# Patient Record
Sex: FEMALE | Race: WHITE | NOT HISPANIC OR LATINO | Employment: FULL TIME | URBAN - METROPOLITAN AREA
[De-identification: names, ages, dates, MRNs, and addresses within clinical notes are randomized per-mention and may not be internally consistent; named-entity substitution may affect disease eponyms.]

---

## 2017-07-14 ENCOUNTER — OFFICE VISIT (OUTPATIENT)
Dept: OBSTETRICS AND GYNECOLOGY | Age: 22
End: 2017-07-14

## 2017-07-14 VITALS
HEIGHT: 68 IN | WEIGHT: 230 LBS | BODY MASS INDEX: 34.86 KG/M2 | SYSTOLIC BLOOD PRESSURE: 130 MMHG | DIASTOLIC BLOOD PRESSURE: 60 MMHG

## 2017-07-14 DIAGNOSIS — N94.6 DYSMENORRHEA: Primary | ICD-10-CM

## 2017-07-14 DIAGNOSIS — Z82.49 FAMILY HISTORY OF DVT: ICD-10-CM

## 2017-07-14 PROCEDURE — 99385 PREV VISIT NEW AGE 18-39: CPT | Performed by: OBSTETRICS & GYNECOLOGY

## 2017-07-14 RX ORDER — DOXYCYCLINE HYCLATE 100 MG/1
100 CAPSULE ORAL DAILY
COMMUNITY

## 2017-07-14 NOTE — PROGRESS NOTES
"Subjective     Chief Complaint   Patient presents with   • Annual Exam     new pt , no problems       History of Present Illness    Ana Sanderson is a 21 y.o.  who presents for annual exam.  Her menses are regular every 28-30 days, lasting 4-7 days, dysmenorrhea moderate, occurring first 1-2 days of flow   She is not sexually active but does have boyfriend so may be in future  Studying biochemistry at [employer]  Obstetric History:  OB History      Para Term  AB TAB SAB Ectopic Multiple Living    0 0 0 0 0 0 0 0 0 0         Menstrual History:  Menarche age: 14 years  Patient's last menstrual period was 2017.  Period Cycle (Days): 28  Period Duration (Days): 5 days  Period Pattern: Regular  Menstrual Flow: Moderate  Dysmenorrhea: (!) Moderate      Current contraception: abstinence  History of abnormal Pap smear: no  Received Gardasil immunization: yes - all 3  Perform regular self breast exam: no  Family history of uterine or ovarian cancer: no  Family History of colon cancer: no  Family history of breast cancer: no    Mammogram: not indicated.  Colonoscopy: not indicated.  DEXA: not indicated.    Exercise: not active  Calcium/Vitamin D: adequate intake    The following portions of the patient's history were reviewed and updated as appropriate: allergies, current medications, past family history, past medical history, past social history, past surgical history and problem list.    Review of Systems    Review of Systems   Constitutional: Negative for fatigue.   Respiratory: Negative for shortness of breath.    Gastrointestinal: Negative for abdominal pain.   Genitourinary: positive for moderate dysmenorrhea   Neurological: Negative for headaches.   Psychiatric/Behavioral: Negative for dysphoric mood.         Objective   Physical Exam    /60  Ht 68\" (172.7 cm)  Wt 230 lb (104 kg)  LMP 2017  BMI 34.97 kg/m2    General:   alert, appears stated age, cooperative and no distress "   Neck: no asymmetry, masses, or scars and thyroid normal to palpation   Heart: regular rate and rhythm, S1, S2 normal, no murmur, click, rub or gallop   Lungs: clear to auscultation bilaterally   Abdomen: soft, non-tender, without masses or organomegaly   Breast: pt declines   Vulva: pt declines   Vagina: pt declines   Cervix: pt declines   Uterus: declines   Adnexa: pt declines   Rectal: not indicated     Assessment/Plan   Ana was seen today for annual exam.    Diagnoses and all orders for this visit:    Visit for gynecologic examination    Dysmenorrhea    Family history of DVT  -     Protein C Deficiency Profile  -     Protein S Panel  -     Antithrombin III  -     Antithrombin Antigen  -     Cardiolipin Antibody  -     Homocysteine  -     Factor 5 Leiden  -     Factor II, DNA Analysis  -     Antinuclear Antibody With Reflex Cascade        All questions answered.  Breast self exam technique reviewed and patient encouraged to perform self-exam monthly.  Discussed healthy lifestyle modifications.  Recommended 30 minutes of aerobic exercise five times per week.  Encouraged condom use if sexually active in future      Discussed options for dysmenorrhea, pt does not remember pills well and is interested in future options for contraception as well. She is interested in Nexplanon. Discussed side effects of irregular bleeding, wt gain, headaches and mood changes. Encouraged pt to watch diet/increase exercise to help with potential wt gain issues, will prior auth and schedule with next menses    Pt defers exam today as not indicated, did advise pelvic exam in future if she becomes sexually active and pap at 22 yo    Pt and her mother desire thrombophilia screening due to family hx, it was in maternal GM so discussed option of her mother doing screen but they prefer for pt to have tests    F/u 3 months, pt will have nexplanon with next menses

## 2017-07-19 LAB
ANA SER QL: NEGATIVE
AT III ACT/NOR PPP CHRO: 105 % (ref 75–135)
AT III AG PPP IA-ACNC: 95 % (ref 72–124)
CARDIOLIPIN IGA SER IA-ACNC: <9 APL U/ML (ref 0–11)
CARDIOLIPIN IGG SER IA-ACNC: <9 GPL U/ML (ref 0–14)
CARDIOLIPIN IGM SER IA-ACNC: <9 MPL U/ML (ref 0–12)
F2 GENE MUT ANL BLD/T: NORMAL
F5 GENE MUT ANL BLD/T: NORMAL
FACTOR V COMMENT: NORMAL
HCYS SERPL-SCNC: 5 UMOL/L (ref 0–15)
Lab: NORMAL
Lab: NORMAL
PROT C ACT/NOR PPP: 93 % (ref 73–180)
PROT C AG ACT/NOR PPP IA: 76 % (ref 60–150)
PROT S ACT/NOR PPP: 44 % (ref 63–140)
PROT S AG ACT/NOR PPP IA: 122 % (ref 60–150)
PROT S FREE AG ACT/NOR PPP IA: 88 % (ref 57–157)

## 2017-07-23 DIAGNOSIS — D68.59 PROTEIN S DEFICIENCY (HCC): Primary | ICD-10-CM

## 2017-07-24 ENCOUNTER — TELEPHONE (OUTPATIENT)
Dept: OBSTETRICS AND GYNECOLOGY | Age: 22
End: 2017-07-24

## 2017-07-26 ENCOUNTER — TELEPHONE (OUTPATIENT)
Dept: OBSTETRICS AND GYNECOLOGY | Age: 22
End: 2017-07-26

## 2017-07-26 NOTE — TELEPHONE ENCOUNTER
Pt called back to review results, discussed that one of protein S values was low but rest of panel was normal. Pt has already scheduled consult with hematology to evaluate further. Advised that it may ultimately not be an issue. Patient noted that she decided to wait on Nexplanon. Her pediatrician encouraged weight reduction and so she is concerned about potential for weight gain with device. She is not sexually active and was just considering options for future. She will contact us if her needs change.

## 2017-08-21 ENCOUNTER — APPOINTMENT (OUTPATIENT)
Dept: ONCOLOGY | Facility: CLINIC | Age: 22
End: 2017-08-21

## 2017-08-21 ENCOUNTER — APPOINTMENT (OUTPATIENT)
Dept: OTHER | Facility: HOSPITAL | Age: 22
End: 2017-08-21

## 2017-09-11 ENCOUNTER — APPOINTMENT (OUTPATIENT)
Dept: ONCOLOGY | Facility: CLINIC | Age: 22
End: 2017-09-11

## 2017-09-11 ENCOUNTER — APPOINTMENT (OUTPATIENT)
Dept: OTHER | Facility: HOSPITAL | Age: 22
End: 2017-09-11